# Patient Record
Sex: FEMALE
[De-identification: names, ages, dates, MRNs, and addresses within clinical notes are randomized per-mention and may not be internally consistent; named-entity substitution may affect disease eponyms.]

---

## 2022-07-01 ENCOUNTER — RX ONLY (OUTPATIENT)
Age: 47
Setting detail: RX ONLY
End: 2022-07-01

## 2024-02-22 ENCOUNTER — LAB (OUTPATIENT)
Dept: URBAN - METROPOLITAN AREA CLINIC 74 | Facility: CLINIC | Age: 49
End: 2024-02-22

## 2024-02-22 ENCOUNTER — OV NP (OUTPATIENT)
Dept: URBAN - METROPOLITAN AREA CLINIC 74 | Facility: CLINIC | Age: 49
End: 2024-02-22
Payer: COMMERCIAL

## 2024-02-22 VITALS
OXYGEN SATURATION: 99 % | TEMPERATURE: 98.1 F | HEART RATE: 75 BPM | BODY MASS INDEX: 26.07 KG/M2 | SYSTOLIC BLOOD PRESSURE: 100 MMHG | DIASTOLIC BLOOD PRESSURE: 72 MMHG | HEIGHT: 69 IN | WEIGHT: 176 LBS

## 2024-02-22 DIAGNOSIS — Z80.0 FAMILY HISTORY OF COLON CANCER: ICD-10-CM

## 2024-02-22 DIAGNOSIS — Z12.11 COLON CANCER SCREENING: ICD-10-CM

## 2024-02-22 DIAGNOSIS — Z83.719 FAMILY HX COLONIC POLYPS: ICD-10-CM

## 2024-02-22 DIAGNOSIS — Z87.19 HISTORY OF IBS: ICD-10-CM

## 2024-02-22 PROBLEM — 312824007: Status: ACTIVE | Noted: 2024-02-22

## 2024-02-22 PROBLEM — 305058001: Status: ACTIVE | Noted: 2024-02-22

## 2024-02-22 PROBLEM — 70871000119100: Status: ACTIVE | Noted: 2024-02-22

## 2024-02-22 PROBLEM — 429969003: Status: ACTIVE | Noted: 2024-02-22

## 2024-02-22 PROBLEM — 29200001000004107: Status: ACTIVE | Noted: 2024-02-22

## 2024-02-22 PROCEDURE — 99243 OFF/OP CNSLTJ NEW/EST LOW 30: CPT | Performed by: INTERNAL MEDICINE

## 2024-02-22 PROCEDURE — 99203 OFFICE O/P NEW LOW 30 MIN: CPT | Performed by: INTERNAL MEDICINE

## 2024-02-22 RX ORDER — TERBINAFINE HYDROCHLORIDE 250 MG/1
TAKE ONE TABLET BY MOUTH ONE TIME DAILY TABLET ORAL
Qty: 30 UNSPECIFIED | Refills: 2 | Status: ACTIVE | COMMUNITY

## 2024-02-22 RX ORDER — LEVALBUTEROL TARTRATE 45 UG/1
AEROSOL, METERED ORAL
Qty: 15 UNSPECIFIED | Status: ACTIVE | COMMUNITY

## 2024-02-22 RX ORDER — SODIUM, POTASSIUM,MAG SULFATES 17.5-3.13G
AS DIRECTED SOLUTION, RECONSTITUTED, ORAL ORAL
OUTPATIENT
Start: 2024-02-22

## 2024-02-22 NOTE — HPI-TODAY'S VISIT:
ThePatient is a 48-year-old  female patient of Dr. Kristin Hoffman who is referred for consultation.  A copy of this document is being forwarded to the referring physician.The patient presents to the office with a history of IBS, she claims that she was first diagnosed with IBS when she was 21 years of age and at that time had a normal colonoscopy.  Ever since the patient has had irregular bowel movements, for the last 18 months the patient has had mostly type V stools on the [place] scale, there has been no diarrhea, no constipation, there is no cramping abdominal pain, the patient does have diet related abdominal bloating and flatulence, when avoiding certain foods like beans she is asymptomatic.  Currently she is not taking any medications for IBS. The patient states that her father was diagnosed with colonic polyps and her paternal grandmother had colon cancer. Currently the patient denies having any dysphagia, odynophagia, nausea, vomiting, heartburn, there has been no hematemesis or melena. The patient denied to any unexplained weight loss. The patient does have a history of endometriosis and had removal of endometrial implants through laparoscopy several years ago. The patient has been recommended to have a screening colonoscopy.  Benefits potential complications and alternatives to colonoscopy have been disclosed. The patient will return for a follow-up visit after completion of testing.

## 2024-04-09 ENCOUNTER — COLON (OUTPATIENT)
Dept: URBAN - METROPOLITAN AREA SURGERY CENTER 30 | Facility: SURGERY CENTER | Age: 49
End: 2024-04-09

## 2024-04-22 ENCOUNTER — OV EP (OUTPATIENT)
Dept: URBAN - METROPOLITAN AREA CLINIC 74 | Facility: CLINIC | Age: 49
End: 2024-04-22

## 2024-04-22 RX ORDER — TERBINAFINE HYDROCHLORIDE 250 MG/1
TAKE ONE TABLET BY MOUTH ONE TIME DAILY TABLET ORAL
Qty: 30 UNSPECIFIED | Refills: 2 | Status: ACTIVE | COMMUNITY

## 2024-04-22 RX ORDER — LEVALBUTEROL TARTRATE 45 UG/1
AEROSOL, METERED ORAL
Qty: 15 UNSPECIFIED | Status: ACTIVE | COMMUNITY

## 2024-04-22 RX ORDER — SODIUM, POTASSIUM,MAG SULFATES 17.5-3.13G
AS DIRECTED SOLUTION, RECONSTITUTED, ORAL ORAL
OUTPATIENT

## 2024-04-22 RX ORDER — SODIUM, POTASSIUM,MAG SULFATES 17.5-3.13G
AS DIRECTED SOLUTION, RECONSTITUTED, ORAL ORAL
Status: ACTIVE | COMMUNITY
Start: 2024-02-22

## 2024-04-22 NOTE — HPI-TODAY'S VISIT:
ThePatient is a 48-year-old  female patient of Dr. Kristin Hoffman who is referred for consultation.  A copy of this document is being forwarded to the referring physician.The patient presents to the office with a history of IBS, she claims that she was first diagnosed with IBS when she was 21 years of age and at that time had a normal colonoscopy.  Ever since the patient has had irregular bowel movements, for the last 18 months the patient has had mostly type V stools on the [place] scale, there has been no diarrhea, no constipation, there is no cramping abdominal pain, the patient does have diet related abdominal bloating and flatulence, when avoiding certain foods like beans she is asymptomatic.  Currently she is not taking any medications for IBS. The patient states that her father was diagnosed with colonic polyps and her paternal grandmother had colon cancer. Currently the patient denies having any dysphagia, odynophagia, nausea, vomiting, heartburn, there has been no hematemesis or melena. The patient denied to any unexplained weight loss. The patient does have a history of endometriosis and had removal of endometrial implants through laparoscopy several years ago. The patient has been recommended to have a screening colonoscopy.  Benefits potential complications and alternatives to colonoscopy have been disclosed. The patient will return for a follow-up visit after completion of testing.  Today April 22, 2024 the patient returns for a follow-up visit, the patient was last seen on February 22, 2024 for colon cancer screening with family history of colonic polyps and colon cancer, history of IBS and history of endometriosis. At the time of the visit the patient complained of IBS, she claims that she had been diagnosed with IBS when she was 21 years of age and at the time had a normal colonoscopy. Ever since the patient had irregular bowel movements, for the last 18 months the patient did have type V stools on the [place] scale, denies having diarrhea constipation, denies having cramping abdominal pain, the patient did have diet related abdominal bloating and flatulence, once the patient avoided certain foods she became asymptomatic.  The patient was not taking medications for IBS. The patient's father was diagnosed with colonic polyps and paternal grandmother with colon cancer. The patient denies having upper GI symptoms such as dysphagia, odynophagia, nausea vomiting or heartburn, there was no evidence of GI bleeding.  There was no unexplained weight loss.  The patient did have a history of endometriosis and had removal of endometrial implants through laparoscopy years past. The patient was to be scheduled for colonoscopy, results are not available.

## 2024-05-08 ENCOUNTER — OFFICE VISIT (OUTPATIENT)
Dept: URBAN - METROPOLITAN AREA CLINIC 74 | Facility: CLINIC | Age: 49
End: 2024-05-08

## 2024-05-25 ENCOUNTER — RX ONLY (OUTPATIENT)
Age: 49
Setting detail: RX ONLY
End: 2024-05-25

## 2024-10-29 ENCOUNTER — RX ONLY (OUTPATIENT)
Age: 49
Setting detail: RX ONLY
End: 2024-10-29

## 2024-10-30 ENCOUNTER — APPOINTMENT (RX ONLY)
Dept: URBAN - METROPOLITAN AREA CLINIC 162 | Facility: CLINIC | Age: 49
Setting detail: DERMATOLOGY
End: 2024-10-30

## 2024-10-30 DIAGNOSIS — L71.0 PERIORAL DERMATITIS: ICD-10-CM | Status: WORSENING

## 2024-10-30 DIAGNOSIS — D485 NEOPLASM OF UNCERTAIN BEHAVIOR OF SKIN: ICD-10-CM | Status: INADEQUATELY CONTROLLED

## 2024-10-30 DIAGNOSIS — L21.8 OTHER SEBORRHEIC DERMATITIS: ICD-10-CM

## 2024-10-30 PROBLEM — D48.5 NEOPLASM OF UNCERTAIN BEHAVIOR OF SKIN: Status: ACTIVE | Noted: 2024-10-30

## 2024-10-30 PROCEDURE — ? DIAGNOSIS COMMENT

## 2024-10-30 PROCEDURE — ? CONSULTATION FOR EXCISION

## 2024-10-30 PROCEDURE — ? PRESCRIPTION

## 2024-10-30 PROCEDURE — ? COUNSELING

## 2024-10-30 PROCEDURE — 99204 OFFICE O/P NEW MOD 45 MIN: CPT

## 2024-10-30 RX ORDER — PIMECROLIMUS 10 MG/G
CREAM TOPICAL
Qty: 30 | Refills: 0 | Status: ERX | COMMUNITY
Start: 2024-10-30

## 2024-10-30 RX ORDER — FLUOCINONIDE 0.5 MG/ML
SOLUTION TOPICAL
Qty: 60 | Refills: 0 | Status: ERX | COMMUNITY
Start: 2024-10-30

## 2024-10-30 RX ORDER — METRONIDAZOLE 7.5 MG/G
CREAM TOPICAL
Qty: 45 | Refills: 0 | Status: ERX | COMMUNITY
Start: 2024-10-30

## 2024-10-30 RX ORDER — KETOCONAZOLE 20 MG/ML
SHAMPOO, SUSPENSION TOPICAL
Qty: 120 | Refills: 3 | Status: ERX | COMMUNITY
Start: 2024-10-30

## 2024-10-30 RX ADMIN — KETOCONAZOLE: 20 SHAMPOO, SUSPENSION TOPICAL at 00:00

## 2024-10-30 RX ADMIN — PIMECROLIMUS: 10 CREAM TOPICAL at 00:00

## 2024-10-30 RX ADMIN — FLUOCINONIDE: 0.5 SOLUTION TOPICAL at 00:00

## 2024-10-30 RX ADMIN — METRONIDAZOLE: 7.5 CREAM TOPICAL at 00:00

## 2024-10-30 ASSESSMENT — LOCATION DETAILED DESCRIPTION DERM
LOCATION DETAILED: RIGHT RIB CAGE
LOCATION DETAILED: NASAL SUPRATIP
LOCATION DETAILED: LEFT SUPERIOR PARIETAL SCALP

## 2024-10-30 ASSESSMENT — LOCATION SIMPLE DESCRIPTION DERM
LOCATION SIMPLE: SCALP
LOCATION SIMPLE: ABDOMEN
LOCATION SIMPLE: NOSE

## 2024-10-30 ASSESSMENT — LOCATION ZONE DERM
LOCATION ZONE: TRUNK
LOCATION ZONE: SCALP
LOCATION ZONE: NOSE

## 2024-12-03 ENCOUNTER — APPOINTMENT (OUTPATIENT)
Dept: URBAN - METROPOLITAN AREA CLINIC 162 | Facility: CLINIC | Age: 49
Setting detail: DERMATOLOGY
End: 2024-12-03

## 2024-12-03 DIAGNOSIS — L71.0 PERIORAL DERMATITIS: ICD-10-CM | Status: IMPROVED

## 2024-12-03 DIAGNOSIS — L65.0 TELOGEN EFFLUVIUM: ICD-10-CM

## 2024-12-03 DIAGNOSIS — L21.8 OTHER SEBORRHEIC DERMATITIS: ICD-10-CM | Status: IMPROVED

## 2024-12-03 DIAGNOSIS — L30.0 NUMMULAR DERMATITIS: ICD-10-CM | Status: INADEQUATELY CONTROLLED

## 2024-12-03 PROCEDURE — 99214 OFFICE O/P EST MOD 30 MIN: CPT

## 2024-12-03 PROCEDURE — ? PRESCRIPTION MEDICATION MANAGEMENT

## 2024-12-03 PROCEDURE — ? OTC TREATMENT REGIMEN

## 2024-12-03 PROCEDURE — ? PRESCRIPTION

## 2024-12-03 PROCEDURE — ? COUNSELING

## 2024-12-03 RX ORDER — TRIAMCINOLONE ACETONIDE 1 MG/G
CREAM TOPICAL BID
Qty: 45 | Refills: 1 | Status: ERX | COMMUNITY
Start: 2024-12-03

## 2024-12-03 RX ADMIN — TRIAMCINOLONE ACETONIDE: 1 CREAM TOPICAL at 00:00

## 2024-12-03 ASSESSMENT — LOCATION SIMPLE DESCRIPTION DERM
LOCATION SIMPLE: SCALP
LOCATION SIMPLE: NOSE
LOCATION SIMPLE: RIGHT HAND

## 2024-12-03 ASSESSMENT — LOCATION ZONE DERM
LOCATION ZONE: SCALP
LOCATION ZONE: HAND
LOCATION ZONE: NOSE

## 2024-12-03 ASSESSMENT — LOCATION DETAILED DESCRIPTION DERM
LOCATION DETAILED: NASAL SUPRATIP
LOCATION DETAILED: RIGHT RADIAL DORSAL HAND
LOCATION DETAILED: LEFT SUPERIOR PARIETAL SCALP

## 2024-12-03 NOTE — PROCEDURE: OTC TREATMENT REGIMEN
Patient Specific Otc Recommendations (Will Not Stick From Patient To Patient): Viviscal hair growth or nutrafol
Detail Level: Zone

## 2024-12-03 NOTE — PROCEDURE: COUNSELING
Detail Level: Zone
Bleach Bath Recommendations: Instructed patient to take a swimming pool or dilute bleach bath 2-3 times per week to cut down on skin infections with colonized bacteria by pouring 1/4 of a measuring cup of plain, unscented bleach into 1/2 tub of warm water or 1 teaspoon of bleach into a gallon of warm water.  Soak/wash for 5-10 minutes, no longer.  Repeat every 2-3 days.
Detail Level: Detailed

## 2024-12-03 NOTE — PROCEDURE: PRESCRIPTION MEDICATION MANAGEMENT
Detail Level: Zone
Discontinue Regimen: Fluocinonide solution, only use prn for flares up to two weeks then stop
Continue Regimen: Ketoconazole shampoo once weekly for maintenance
Render In Strict Bullet Format?: No
Discontinue Regimen: Pimecrolimus, restart for flares
Continue Regimen: Metronidazole cream apply to face daily for maintenance
Initiate Treatment: Triamcinolone cream apply to hand twice daily x 2 weeks then stop and use as needed for flares